# Patient Record
Sex: FEMALE | Race: BLACK OR AFRICAN AMERICAN | Employment: FULL TIME | URBAN - METROPOLITAN AREA
[De-identification: names, ages, dates, MRNs, and addresses within clinical notes are randomized per-mention and may not be internally consistent; named-entity substitution may affect disease eponyms.]

---

## 2018-09-29 ENCOUNTER — APPOINTMENT (OUTPATIENT)
Dept: CT IMAGING | Age: 38
End: 2018-09-29
Attending: EMERGENCY MEDICINE
Payer: SELF-PAY

## 2018-09-29 ENCOUNTER — HOSPITAL ENCOUNTER (EMERGENCY)
Age: 38
Discharge: HOME | End: 2018-09-29
Attending: EMERGENCY MEDICINE
Payer: SELF-PAY

## 2018-09-29 VITALS
HEART RATE: 82 BPM | DIASTOLIC BLOOD PRESSURE: 59 MMHG | TEMPERATURE: 98.1 F | RESPIRATION RATE: 10 BRPM | OXYGEN SATURATION: 99 % | SYSTOLIC BLOOD PRESSURE: 122 MMHG

## 2018-09-29 DIAGNOSIS — R20.2 PARESTHESIA: Primary | ICD-10-CM

## 2018-09-29 LAB
ALBUMIN SERPL-MCNC: 4.1 G/DL (ref 3.4–5)
ALBUMIN/GLOB SERPL: 1 {RATIO} (ref 0.8–1.7)
ALP SERPL-CCNC: 99 U/L (ref 45–117)
ALT SERPL-CCNC: 21 U/L (ref 13–56)
ANION GAP SERPL CALC-SCNC: 8 MMOL/L (ref 3–18)
AST SERPL-CCNC: 13 U/L (ref 15–37)
BASOPHILS # BLD: 0 K/UL (ref 0–0.1)
BASOPHILS NFR BLD: 0 % (ref 0–2)
BILIRUB SERPL-MCNC: 0.3 MG/DL (ref 0.2–1)
BUN SERPL-MCNC: 16 MG/DL (ref 7–18)
BUN/CREAT SERPL: 20 (ref 12–20)
CALCIUM SERPL-MCNC: 9.4 MG/DL (ref 8.5–10.1)
CHLORIDE SERPL-SCNC: 110 MMOL/L (ref 100–108)
CO2 SERPL-SCNC: 26 MMOL/L (ref 21–32)
CREAT SERPL-MCNC: 0.8 MG/DL (ref 0.6–1.3)
DIFFERENTIAL METHOD BLD: NORMAL
EOSINOPHIL # BLD: 0 K/UL (ref 0–0.4)
EOSINOPHIL NFR BLD: 0 % (ref 0–5)
ERYTHROCYTE [DISTWIDTH] IN BLOOD BY AUTOMATED COUNT: 13.6 % (ref 11.6–14.5)
GLOBULIN SER CALC-MCNC: 4 G/DL (ref 2–4)
GLUCOSE SERPL-MCNC: 86 MG/DL (ref 74–99)
HCT VFR BLD AUTO: 40.7 % (ref 35–45)
HGB BLD-MCNC: 13.6 G/DL (ref 12–16)
LYMPHOCYTES # BLD: 1.7 K/UL (ref 0.9–3.6)
LYMPHOCYTES NFR BLD: 30 % (ref 21–52)
MCH RBC QN AUTO: 28.6 PG (ref 24–34)
MCHC RBC AUTO-ENTMCNC: 33.4 G/DL (ref 31–37)
MCV RBC AUTO: 85.7 FL (ref 74–97)
MONOCYTES # BLD: 0.5 K/UL (ref 0.05–1.2)
MONOCYTES NFR BLD: 9 % (ref 3–10)
NEUTS SEG # BLD: 3.4 K/UL (ref 1.8–8)
NEUTS SEG NFR BLD: 61 % (ref 40–73)
PLATELET # BLD AUTO: 318 K/UL (ref 135–420)
PMV BLD AUTO: 10.2 FL (ref 9.2–11.8)
POTASSIUM SERPL-SCNC: 3.7 MMOL/L (ref 3.5–5.5)
PROT SERPL-MCNC: 8.1 G/DL (ref 6.4–8.2)
RBC # BLD AUTO: 4.75 M/UL (ref 4.2–5.3)
SODIUM SERPL-SCNC: 144 MMOL/L (ref 136–145)
TSH SERPL DL<=0.05 MIU/L-ACNC: 0.65 UIU/ML (ref 0.36–3.74)
WBC # BLD AUTO: 5.7 K/UL (ref 4.6–13.2)

## 2018-09-29 PROCEDURE — 80053 COMPREHEN METABOLIC PANEL: CPT

## 2018-09-29 PROCEDURE — 99283 EMERGENCY DEPT VISIT LOW MDM: CPT

## 2018-09-29 PROCEDURE — 84443 ASSAY THYROID STIM HORMONE: CPT

## 2018-09-29 PROCEDURE — 85025 COMPLETE CBC W/AUTO DIFF WBC: CPT

## 2018-09-29 PROCEDURE — 70450 CT HEAD/BRAIN W/O DYE: CPT

## 2018-09-29 NOTE — ED PROVIDER NOTES
EMERGENCY DEPARTMENT HISTORY AND PHYSICAL EXAM 
 
1:32 PM 
 
 
Date: 9/29/2018 Patient Name: Jennie Marshall History of Presenting Illness Chief Complaint Patient presents with  
 Other History Provided By: Patient Chief Complaint: Numbness Duration:2  Weeks Timing:  Constant and Worsening Location: left side hand, arm, flank, leg Quality: \"numbness\" Severity: Moderate Modifying Factors: No modifying or aggravating factors were reported. Associated Symptoms: denies any other associated signs or symptoms Additional History (Context): 1:34 PM Jennie Marshall is a 41 y.o. female with no pertinent pmhx who presents to ED complaining of moderate constant, worsening numbness on the left side of her body with onset about two weeks ago. Pt was concerned about her tooth causing an infection. She says that her left side feels different that the right side as it feels \"stiff, burning like. \" Denies any numbness in her face or being in any pain today. She does have a family history of a stroke. She did have a similar episode 7 years ago that was also characterized by \"santiago pain. \" Pt denies having HTN or diabetes. No modifying or aggravating factors were reported. No other concerns or symptoms at this time. PCP: None Past History Past Medical History: 
History reviewed. No pertinent past medical history. Past Surgical History: 
History reviewed. No pertinent surgical history. Family History: 
History reviewed. No pertinent family history. Social History: 
Social History Substance Use Topics  Smoking status: None  Smokeless tobacco: None  Alcohol use None Allergies: 
No Known Allergies Review of Systems Review of Systems Constitutional: Negative for chills and fever. Respiratory: Negative for shortness of breath. Cardiovascular: Negative for chest pain. Gastrointestinal: Negative for nausea and vomiting. Neurological: Positive for numbness (left side). All other systems reviewed and are negative. Physical Exam  
 
Visit Vitals  BP (!) 147/103  Pulse (!) 111  Temp 98.1 °F (36.7 °C)  Resp 18  SpO2 100% Physical Exam  
Constitutional: She is oriented to person, place, and time. She appears well-developed and well-nourished. No distress. HENT:  
Head: Normocephalic and atraumatic. Eyes: Conjunctivae and EOM are normal. Right eye exhibits no discharge. Left eye exhibits no discharge. No scleral icterus. Neck: Normal range of motion. Neck supple. No tracheal deviation present. Cardiovascular: Normal rate, regular rhythm and normal heart sounds. No murmur heard. Pulmonary/Chest: Effort normal and breath sounds normal. No respiratory distress. She has no wheezes. She has no rales. Abdominal: Soft. She exhibits no distension. There is no tenderness. There is no rebound and no guarding. Musculoskeletal: Normal range of motion. She exhibits no edema or deformity. Neurological: She is alert and oriented to person, place, and time. No cranial nerve deficit. Decreased sensation in the left upper and lower extremities. Skin: Skin is warm and dry. She is not diaphoretic. Psychiatric: She has a normal mood and affect. Her behavior is normal. Judgment and thought content normal.  
 
Diagnostic Study Results Labs - Recent Results (from the past 12 hour(s)) CBC WITH AUTOMATED DIFF Collection Time: 09/29/18  1:05 PM  
Result Value Ref Range WBC 5.7 4.6 - 13.2 K/uL  
 RBC 4.75 4.20 - 5.30 M/uL  
 HGB 13.6 12.0 - 16.0 g/dL HCT 40.7 35.0 - 45.0 % MCV 85.7 74.0 - 97.0 FL  
 MCH 28.6 24.0 - 34.0 PG  
 MCHC 33.4 31.0 - 37.0 g/dL  
 RDW 13.6 11.6 - 14.5 % PLATELET 497 477 - 140 K/uL MPV 10.2 9.2 - 11.8 FL  
 NEUTROPHILS 61 40 - 73 % LYMPHOCYTES 30 21 - 52 % MONOCYTES 9 3 - 10 % EOSINOPHILS 0 0 - 5 % BASOPHILS 0 0 - 2 %  
 ABS. NEUTROPHILS 3.4 1.8 - 8.0 K/UL ABS. LYMPHOCYTES 1.7 0.9 - 3.6 K/UL  
 ABS. MONOCYTES 0.5 0.05 - 1.2 K/UL  
 ABS. EOSINOPHILS 0.0 0.0 - 0.4 K/UL  
 ABS. BASOPHILS 0.0 0.0 - 0.1 K/UL  
 DF AUTOMATED METABOLIC PANEL, COMPREHENSIVE Collection Time: 09/29/18  1:05 PM  
Result Value Ref Range Sodium 144 136 - 145 mmol/L Potassium 3.7 3.5 - 5.5 mmol/L Chloride 110 (H) 100 - 108 mmol/L  
 CO2 26 21 - 32 mmol/L Anion gap 8 3.0 - 18 mmol/L Glucose 86 74 - 99 mg/dL BUN 16 7.0 - 18 MG/DL Creatinine 0.80 0.6 - 1.3 MG/DL  
 BUN/Creatinine ratio 20 12 - 20 GFR est AA >60 >60 ml/min/1.73m2 GFR est non-AA >60 >60 ml/min/1.73m2 Calcium 9.4 8.5 - 10.1 MG/DL Bilirubin, total PENDING MG/DL  
 ALT (SGPT) 21 13 - 56 U/L  
 AST (SGOT) 13 (L) 15 - 37 U/L Alk. phosphatase PENDING U/L Protein, total PENDING g/dL Albumin 4.1 3.4 - 5.0 g/dL Globulin PENDING g/dL A-G Ratio PENDING Radiologic Studies -  
CT HEAD WO CONT    (Results Pending) Medical Decision Making I am the first provider for this patient. I reviewed the vital signs, available nursing notes, past medical history, past surgical history, family history and social history. Vital Signs-Reviewed the patient's vital signs. Pulse Oximetry Analysis -  100% on room air - stable Records Reviewed: Nursing Notes (Time of Review: 1:32 PM) 
 
ED Course: Progress Notes, Reevaluation, and Consults: 
Consult:  Discussed care with  Dr. Palmira Aly (teleneurology) Standard discussion; including history of patients chief complaint, available diagnostic results, and treatment course. Will see the patient. 
2:01 PM, 9/29/2018 Consult:  Discussed care with Dr. Palmira Aly (teleneurology) Standard discussion; including history of patients chief complaint, available diagnostic results, and treatment course. Recommends an MRI which can and will be done as an outpatient. 2:36 PM, 9/29/2018 Provider Notes (Medical Decision Making): 
 
Diagnosis Clinical Impression: No diagnosis found. Disposition: 2:46 PM : Pt care transferred to Micaela Tipton NP, ED provider. History of patient complaint(s), available diagnostic reports and current treatment plan has been discussed thoroughly. Bedside rounding on patient occured : yes . Intended disposition of patient : TBD Pending diagnostics reports and/or labs (please list): Lab and CT results Follow-up Information None Patient's Medications No medications on file  
 
_______________________________ Attestations: 
Scribe Attestation Christal Serrano acting as a scribe for and in the presence of Berta Cockayne, MD     
September 29, 2018 at 2:51 PM 
    
Provider Attestation:     
I personally performed the services described in the documentation, reviewed the documentation, as recorded by the scribe in my presence, and it accurately and completely records my words and actions. September 29, 2018 at 2:51 PM - Berta Cockayne, MD   
_______________________________ PROGRESS NOTE:  Assumed care of the Pt from Dr Bryson Hugo at time of shift change. Deborah Najera NP   3:44 PM 
 
PROGRESS NOTE:  The CT scan wet read was reviewed. No acute findings. MM:  Will DC to home for out patient neuro follow up   Deborah Najera NP  4:10 PM 
 
Diagnosis: 1. Paresthesia Disposition:   discharged to Home. Follow-up Information Follow up With Details Comments Contact Info [facility] Schedule an appointment as soon as possible for a visit  [address] [address] ([facility]) [address] [phone] [facility] EMERGENCY DEPT  If symptoms worsen [address] 
[phone] Patient's Medications No medications on file

## 2018-09-29 NOTE — ED TRIAGE NOTES
Two weeks with left hand and left side numbness  Thinks it is from the  wisdom tooth on right lower that  needs to come out.

## 2018-09-29 NOTE — ED NOTES
[facility]

## 2018-09-29 NOTE — DISCHARGE INSTRUCTIONS
Numbness and Tingling: Care Instructions  Your Care Instructions    Many things can cause numbness or tingling. Swelling may put pressure on a nerve. This could cause you to lose feeling or have a pins-and-needles sensation on part of your body. Nerves may be damaged from trauma, toxins, or diseases, such as diabetes or multiple sclerosis (MS). Sometimes, though, the cause is not clear. If there is no clear reason for your symptoms, and you are not having any other symptoms, your doctor may suggest watching and waiting for a while to see if the numbness or tingling goes away on its own. Your doctor may want you to have blood or nerve tests to find the cause of your symptoms. Follow-up care is a key part of your treatment and safety. Be sure to make and go to all appointments, and call your doctor if you are having problems. It's also a good idea to know your test results and keep a list of the medicines you take. How can you care for yourself at home? · If your doctor prescribes medicine, take it exactly as directed. Call your doctor if you think you are having a problem with your medicine. · If you have any swelling, put ice or a cold pack on the area for 10 to 20 minutes at a time. Put a thin cloth between the ice and your skin. When should you call for help? Call 911 anytime you think you may need emergency care. For example, call if:    · You have weakness, numbness, or tingling in both legs.     · You lose bowel or bladder control.     · You have symptoms of a stroke. These may include:  ¨ Sudden numbness, tingling, weakness, or loss of movement in your face, arm, or leg, especially on only one side of your body. ¨ Sudden vision changes. ¨ Sudden trouble speaking. ¨ Sudden confusion or trouble understanding simple statements. ¨ Sudden problems with walking or balance.   ¨ A sudden, severe headache that is different from past headaches.    Watch closely for changes in your health, and be sure to contact your doctor if you have any problems, or if:    · You do not get better as expected. Where can you learn more? Go to http://yara-svitlana.info/. Enter A181 in the search box to learn more about \"Numbness and Tingling: Care Instructions. \"  Current as of: September 10, 2017  Content Version: 11.7  © 4393-6554 [email or web address]. Care instructions adapted under license by [employer] (which disclaims liability or warranty for this information). If you have questions about a medical condition or this instruction, always ask your healthcare professional. [place] [address] any warranty or liability for your use of this information.

## 2018-12-01 ENCOUNTER — HOSPITAL ENCOUNTER (EMERGENCY)
Age: 38
Discharge: HOME | End: 2018-12-01
Attending: EMERGENCY MEDICINE
Payer: SELF-PAY

## 2018-12-01 VITALS
OXYGEN SATURATION: 98 % | TEMPERATURE: 98.3 F | BODY MASS INDEX: 32.44 KG/M2 | SYSTOLIC BLOOD PRESSURE: 129 MMHG | DIASTOLIC BLOOD PRESSURE: 88 MMHG | RESPIRATION RATE: 16 BRPM | HEIGHT: 64 IN | HEART RATE: 73 BPM | WEIGHT: 190 LBS

## 2018-12-01 DIAGNOSIS — M54.12 CERVICAL RADICULOPATHY: Primary | ICD-10-CM

## 2018-12-01 PROCEDURE — 99282 EMERGENCY DEPT VISIT SF MDM: CPT

## 2018-12-01 RX ORDER — CYCLOBENZAPRINE HCL 5 MG
10 TABLET ORAL 3 TIMES DAILY
Qty: 18 TAB | Refills: 0 | Status: SHIPPED | OUTPATIENT
Start: 2018-12-01

## 2018-12-01 NOTE — DISCHARGE INSTRUCTIONS
Pinched Nerve in the Neck: Care Instructions  Your Care Instructions  A pinched nerve in the neck happens when a vertebra or disc in the upper part of your spine is damaged. This damage can happen because of an injury. Or it can just happen with age. The changes caused by the damage may put pressure on a nearby nerve root, pinching it. This causes symptoms such as sharp pain in your neck, shoulder, arm, hand, or back. You may also have tingling or numbness. Sometimes it makes your arm weaker. The symptoms are usually worse when you turn your head or strain your neck. For many people, the symptoms get better over time and finally go away. Early treatment usually includes medicines for pain and swelling. Sometimes physical therapy and special exercises may help. Follow-up care is a key part of your treatment and safety. Be sure to make and go to all appointments, and call your doctor if you are having problems. It's also a good idea to know your test results and keep a list of the medicines you take. How can you care for yourself at home? · Be safe with medicines. Read and follow all instructions on the label. ¨ If the doctor gave you a prescription medicine for pain, take it as prescribed. ¨ If you are not taking a prescription pain medicine, ask your doctor if you can take an over-the-counter medicine. · Try using a heating pad on a low or medium setting for 15 to 20 minutes every 2 or 3 hours. Try a warm shower in place of one session with the heating pad. You can also buy single-use heat wraps that last up to 8 hours. · You can also try an ice pack for 10 to 15 minutes every 2 to 3 hours. There isn't strong evidence that either heat or ice will help. But you can try them to see if they help you. · Don't spend too long in one position. Take short breaks to move around and change positions. · Wear a seat belt and shoulder harness when you are in a car.   · Sleep with a pillow under your head and neck that keeps your neck straight. · If you were given a neck brace (cervical collar) to limit neck motion, wear it as instructed for as many days as your doctor tells you to. Do not wear it longer than you were told to. Wearing a brace for too long can lead to neck stiffness and can weaken the neck muscles. · Follow your doctor's instructions for gentle neck-stretching exercises. · Do not smoke. Smoking can slow healing of your discs. If you need help quitting, talk to your doctor about stop-smoking programs and medicines. These can increase your chances of quitting for good. · Avoid strenuous work or exercise until your doctor says it is okay. When should you call for help? Call 911 anytime you think you may need emergency care. For example, call if:  ? · You are unable to move an arm or a leg at all. ?Call your doctor now or seek immediate medical care if:  ? · You have new or worse symptoms in your arms, legs, chest, belly, or buttocks. Symptoms may include:  ¨ Numbness or tingling. ¨ Weakness. ¨ Pain. ? · You lose bladder or bowel control. ? Watch closely for changes in your health, and be sure to contact your doctor if:  ? · You are not getting better as expected. Where can you learn more? Go to http://yara-svitlana.info/. Enter F273 in the search box to learn more about \"Pinched Nerve in the Neck: Care Instructions. \"  Current as of: March 21, 2017  Content Version: 11.5  © 6035-5765 [employer]. Care instructions adapted under license by [employer] (which disclaims liability or warranty for this information). If you have questions about a medical condition or this instruction, always ask your healthcare professional. [place] [address] any warranty or liability for your use of this information.

## 2018-12-01 NOTE — ED PROVIDER NOTES
EMERGENCY DEPARTMENT HISTORY AND PHYSICAL EXAM 
 
10:48 AM 
 
 
Date: 12/1/2018 Patient Name: Carl Patino History of Presenting Illness Chief Complaint Patient presents with  Neck Pain History Provided By: Patient Chief Complaint: Neck Pain Duration: 1 Weeks Timing:  Constant Location: Neck Quality: Fermin Hives Severity: 7 out of 10 Modifying Factors: Nothing makes it better or worse Associated Symptoms: tingling in left hand Additional History (Context): Carl Patino is a 41 y.o. female with No significant past medical history who presents with constant neck pain that has been occurring for 1 week. Patient states that she has been having some tingling in her left hand and is being evaluated for it. She said she got an MRI 2 days ago for it but has not gotten the results yet. Patient said that she also smokes cigarettes. The quality is sharp and the severity is a 7 out of 10. Nothing makes it better or worse. Denies numbness, weakness, injury. No other concerns or symptoms at this time. PCP: None Current Outpatient Medications Medication Sig Dispense Refill  cyclobenzaprine (FLEXERIL) 5 mg tablet Take 2 Tabs by mouth three (3) times daily. 18 Tab 0 Past History Social History: 
Social History Tobacco Use  Smoking status: Current Every Day Smoker  Smokeless tobacco: Never Used Substance Use Topics  Alcohol use: No  
  Frequency: Never  Drug use: Not on file Review of Systems Review of Systems Constitutional: Negative for chills and fever. HENT: Negative for congestion. Eyes: Negative for redness. Respiratory: Negative for cough and shortness of breath. Cardiovascular: Negative for chest pain. Gastrointestinal: Negative for abdominal pain, diarrhea, nausea and vomiting. Genitourinary: Negative for difficulty urinating. Musculoskeletal: Positive for neck pain (been very stiff). Skin: Negative for rash. Neurological: Negative for dizziness. Tingling in left hand All other systems reviewed and are negative. Physical Exam  
 
Visit Vitals /88 (BP 1 Location: Right arm, BP Patient Position: Sitting) Pulse 73 Temp 98.3 °F (36.8 °C) Resp 16 Ht 5' 4\" (1.626 m) Wt 86.2 kg (190 lb) SpO2 98% BMI 32.61 kg/m² Physical Exam  
Constitutional: She is oriented to person, place, and time. She appears well-developed and well-nourished. No distress. HENT:  
Head: Normocephalic and atraumatic. Eyes: Conjunctivae are normal. Right eye exhibits no discharge. Left eye exhibits no discharge. No scleral icterus. Neck: Normal range of motion. Neck supple. TTP to left paracervical musculature extending into superior parathoracic region; no bony TTP.  + Spurling test to left. 5/5  strength and NVI distally bilaterally. Cardiovascular: Normal rate and regular rhythm. Exam reveals no gallop and no friction rub. No murmur heard. Pulmonary/Chest: Effort normal and breath sounds normal. No respiratory distress. She has no wheezes. She has no rales. Abdominal: Soft. Bowel sounds are normal.  
Neurological: She is alert and oriented to person, place, and time. She exhibits normal muscle tone. Skin: Skin is warm and dry. No rash noted. She is not diaphoretic. No erythema. No pallor. Psychiatric: She has a normal mood and affect. Her behavior is normal. Judgment and thought content normal.  
Nursing note and vitals reviewed. Medical Decision Making I am the first provider for this patient. I reviewed the vital signs, available nursing notes, past medical history, past surgical history, family history and social history. Vital Signs-Reviewed the patient's vital signs. Pulse Oximetry Analysis -  98% on room air Cardiac Monitor: 
Rate: 73 bpm 
 
Records Reviewed: Nursing Notes (Time of Review: 10:48 AM) ED Course: Progress Notes, Reevaluation, and Consults: Pt has left sided neck pain, tingling in her left hand with + Spurling test.  Likely cervical radiculopathy. States she had an MRI of her neck but has not gotten th results. Will write for flexeril and have her f/u with her doctor for her MRI result. Diagnosis Clinical Impression: 1. Cervical radiculopathy Disposition: Discharged Follow-up Information Follow up With Specialties Details Why Contact Info [facility], [address]  In 3 days  [address] ([facility]) [address] [phone] [facility] EMERGENCY DEPT Emergency Medicine  If symptoms worsen [address] 
[phone] Medication List  
  
START taking these medications   
cyclobenzaprine 5 mg tablet Commonly known as:  FLEXERIL Take 2 Tabs by mouth three (3) times daily. Where to Get Your Medications Information about where to get these medications is not yet available Ask your nurse or doctor about these medications · cyclobenzaprine 5 mg tablet 
  
 
_______________________________ Scribe Attestation Christos Kiran as a scribe for and in the presence of Melina Wise [place] December 01, 2018 at 10:51 AM 
    
Provider Attestation:     
I personally performed the services described in the documentation, reviewed the documentation, as recorded by the scribe in my presence, and it accurately and completely records my words and actions. December 01, 2018 at 10:51 AM - DAWIT Husain  
_______________________________ DAWIT [address]